# Patient Record
Sex: FEMALE | Race: BLACK OR AFRICAN AMERICAN | URBAN - METROPOLITAN AREA
[De-identification: names, ages, dates, MRNs, and addresses within clinical notes are randomized per-mention and may not be internally consistent; named-entity substitution may affect disease eponyms.]

---

## 2019-08-22 ENCOUNTER — APPOINTMENT (RX ONLY)
Dept: URBAN - METROPOLITAN AREA CLINIC 141 | Facility: CLINIC | Age: 39
Setting detail: DERMATOLOGY
End: 2019-08-22

## 2019-08-22 DIAGNOSIS — L82.1 OTHER SEBORRHEIC KERATOSIS: ICD-10-CM

## 2019-08-22 DIAGNOSIS — L81.4 OTHER MELANIN HYPERPIGMENTATION: ICD-10-CM

## 2019-08-22 PROCEDURE — ? COUNSELING

## 2019-08-22 PROCEDURE — ? PRESCRIPTION

## 2019-08-22 PROCEDURE — ? TREATMENT REGIMEN

## 2019-08-22 PROCEDURE — 99201: CPT

## 2019-08-22 RX ORDER — CETIRIZINE HCL/PSEUDOEPHEDRINE 5 MG-120MG
TABLET, EXTENDED RELEASE 12 HR ORAL
Qty: 30 | Refills: 11 | Status: ERX | COMMUNITY
Start: 2019-08-22

## 2019-08-22 RX ORDER — HYDROQUINONE 4 %
CREAM (GRAM) TOPICAL
Qty: 1 | Refills: 2 | Status: ERX | COMMUNITY
Start: 2019-08-22

## 2019-08-22 RX ADMIN — Medication: at 20:06

## 2019-08-22 RX ADMIN — Medication: at 20:15

## 2019-08-22 ASSESSMENT — LOCATION DETAILED DESCRIPTION DERM
LOCATION DETAILED: LEFT SUPERIOR MEDIAL MALAR CHEEK
LOCATION DETAILED: LEFT MEDIAL MALAR CHEEK
LOCATION DETAILED: RIGHT SUPERIOR MEDIAL MALAR CHEEK

## 2019-08-22 ASSESSMENT — LOCATION SIMPLE DESCRIPTION DERM
LOCATION SIMPLE: LEFT CHEEK
LOCATION SIMPLE: RIGHT CHEEK

## 2019-08-22 ASSESSMENT — LOCATION ZONE DERM: LOCATION ZONE: FACE

## 2019-08-22 ASSESSMENT — SEVERITY ASSESSMENT: SEVERITY: MODERATE

## 2019-08-22 NOTE — PROCEDURE: TREATMENT REGIMEN
Detail Level: Detailed
Plan: pt is anemic and is suppose to be taking iron supplements. ARCENIOKY encourages pt to continue with iron supplements as directed by PCP. Patient stated she had lab work done a month ago and levels were still low. 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 FIDENCIO also discussed taking Zyrtec to help with seasonal allergies because they can also be contributing to the hyperpigmentation.\\nJKY advised patient to have PCP check B12 level at next lab order
Samples Given: [facility]iere [facility] cream from [facility]
Plan: discussed cosmetic removal $150 for up to 15 lesions